# Patient Record
Sex: FEMALE | Race: WHITE | NOT HISPANIC OR LATINO | URBAN - METROPOLITAN AREA
[De-identification: names, ages, dates, MRNs, and addresses within clinical notes are randomized per-mention and may not be internally consistent; named-entity substitution may affect disease eponyms.]

---

## 2017-10-30 ENCOUNTER — HISTORICAL (OUTPATIENT)
Dept: SURGERY | Facility: HOSPITAL | Age: 62
End: 2017-10-30

## 2017-10-30 LAB
ABS NEUT (OLG): 4.5 X10(3)/MCL (ref 1.5–6.9)
APPEARANCE, UA: CLEAR
APTT PPP: 24.2 SECOND(S) (ref 25–35)
BILIRUB UR QL STRIP: NEGATIVE
BUN SERPL-MCNC: 30 MG/DL (ref 10–20)
CALCIUM SERPL-MCNC: 9.6 MG/DL (ref 8–10.5)
CHLORIDE SERPL-SCNC: 106 MMOL/L (ref 100–108)
CO2 SERPL-SCNC: 31 MMOL/L (ref 21–35)
COLOR UR: NORMAL
CREAT SERPL-MCNC: 0.9 MG/DL (ref 0.7–1.3)
ERYTHROCYTE [DISTWIDTH] IN BLOOD BY AUTOMATED COUNT: 13.6 % (ref 11.5–17)
GLUCOSE (UA): NEGATIVE
GLUCOSE SERPL-MCNC: 95 MG/DL (ref 75–116)
HCT VFR BLD AUTO: 40 % (ref 36–48)
HGB BLD-MCNC: 12.6 GM/DL (ref 12–16)
HGB UR QL STRIP: NEGATIVE
INR PPP: 0.9 (ref 0–1.2)
KETONES UR QL STRIP: NEGATIVE
LEUKOCYTE ESTERASE UR QL STRIP: NEGATIVE
MCH RBC QN AUTO: 30 PG (ref 27–34)
MCHC RBC AUTO-ENTMCNC: 32 GM/DL (ref 31–36)
MCV RBC AUTO: 95 FL (ref 80–99)
NITRITE UR QL STRIP: NEGATIVE
PH UR STRIP: 5.5 [PH]
PLATELET # BLD AUTO: 318 X10(3)/MCL (ref 140–400)
PMV BLD AUTO: 10.3 FL (ref 6.8–10)
POTASSIUM SERPL-SCNC: 4.9 MMOL/L (ref 3.6–5.2)
PROT UR QL STRIP: NEGATIVE
PROTHROMBIN TIME: 10 SECOND(S) (ref 9–12)
RBC # BLD AUTO: 4.2 X10(6)/MCL (ref 4.2–5.4)
SODIUM SERPL-SCNC: 141 MMOL/L (ref 135–145)
SP GR UR STRIP: 1.02
UROBILINOGEN UR STRIP-ACNC: 0.2 EU/DL
WBC # SPEC AUTO: 7.2 X10(3)/MCL (ref 4.5–11.5)

## 2017-11-07 ENCOUNTER — HISTORICAL (OUTPATIENT)
Dept: ANESTHESIOLOGY | Facility: HOSPITAL | Age: 62
End: 2017-11-07

## 2018-03-05 ENCOUNTER — HISTORICAL (OUTPATIENT)
Dept: RADIOLOGY | Facility: HOSPITAL | Age: 63
End: 2018-03-05

## 2018-11-07 ENCOUNTER — HISTORICAL (OUTPATIENT)
Dept: RADIOLOGY | Facility: HOSPITAL | Age: 63
End: 2018-11-07

## 2019-03-12 ENCOUNTER — HISTORICAL (OUTPATIENT)
Dept: RADIOLOGY | Facility: HOSPITAL | Age: 64
End: 2019-03-12

## 2019-03-21 ENCOUNTER — HISTORICAL (OUTPATIENT)
Dept: RADIOLOGY | Facility: HOSPITAL | Age: 64
End: 2019-03-21

## 2019-08-06 ENCOUNTER — HISTORICAL (OUTPATIENT)
Dept: ANESTHESIOLOGY | Facility: HOSPITAL | Age: 64
End: 2019-08-06

## 2020-10-05 ENCOUNTER — HISTORICAL (OUTPATIENT)
Dept: RADIOLOGY | Facility: HOSPITAL | Age: 65
End: 2020-10-05

## 2021-01-04 ENCOUNTER — HISTORICAL (OUTPATIENT)
Dept: RADIOLOGY | Facility: HOSPITAL | Age: 66
End: 2021-01-04

## 2022-01-05 ENCOUNTER — HISTORICAL (OUTPATIENT)
Dept: RADIOLOGY | Facility: HOSPITAL | Age: 67
End: 2022-01-05

## 2022-04-10 ENCOUNTER — HISTORICAL (OUTPATIENT)
Dept: ADMINISTRATIVE | Facility: HOSPITAL | Age: 67
End: 2022-04-10

## 2022-04-25 VITALS
HEIGHT: 61 IN | WEIGHT: 164.88 LBS | DIASTOLIC BLOOD PRESSURE: 79 MMHG | OXYGEN SATURATION: 100 % | SYSTOLIC BLOOD PRESSURE: 142 MMHG | BODY MASS INDEX: 31.13 KG/M2

## 2022-04-30 NOTE — OP NOTE
Patient:   Jelly Subramanian             MRN: [id number]            FIN: [id number]               Age:   63 years     Sex:  Female     :  1955   Associated Diagnoses:   Personal history of colonic polyps; Bright red rectal bleeding; Rectal polyp; Grade I internal hemorrhoids   Author:   Macy Harris MD      Operative Note   Operative Information   Date/ Time:  2019 09:35:00.     Procedures Performed: Procedure Code   Colonoscopy, flexible; with removal of tumor(s), polyp(s), or other lesion(s) by hot biopsy forceps (69319).  Colonoscopy, flexible; with ablation of tumor(s), polyp(s), or other lesion(s) (includes pre- and post-dilation and guide wire passage, when performed) (67581)..     Indications: 63-year-old white female with previous history of colon polyps on previous colonoscopy having significant pelvic pain discomfort with associated bleeding all movements in need of diagnostic colonoscopy.     Preoperative Diagnosis: Personal history of colonic polyps (QEO34-OB Z86.010), Bright red rectal bleeding (CQE12-EL K62.5).     Postoperative Diagnosis: Rectal polyp (QEG37-OO K62.1), Grade I internal hemorrhoids (PIN45-NZ K64.0).     Surgeon: Macy Harris MD.     Anesthesia: Intravenous MAC.     Speciman Removed: Rectal polyp at 10 cm from anal verge.     Description of Procedure/Findings/    Complications: Patient was brought to the endoscopy suite laid in a left lateral decubitus position right side up.  Intravenous anesthesia was provided.  Digital rectal exam performed exhibiting good anal rectal tone and no masses.  An endoscope was then passed through the anus intubating the rectum and with gentle insufflation reaching the cecum.  Upon reaching the cecum pictures were taken the scope was then slowly withdrawn.  Overall the cecum ascending transverse descending and sigmoid colon appear to be grossly normal without mass polyp or ulceration seen.  At approximately 10 cm from the anal  verge within the rectum a small subcentimeter sessile polyp was found.  Hot forcep polypectomy was performed successfully and sent to pathology.  Scope was retroflexed the distal rectum revealing small grade 1 internal hemorrhoids without sign of thrombosis ulceration or bleeding.  There was a sclerosed hemorrhoid associated with this region but otherwise no significant changes.  Scope was returned to neutral position the colon was evacuated of air.  Patient was relieved of anesthesia stable condition transfer the postanesthesia care unit..     Esimated blood loss: loss  1  cc.     Findings: 1.  Small internal hemorrhoids and small rectal polyp at 10 cm from anal verge.     Complications: None.     Notes: Mentation to repeat colonoscopy in 3 to 5 years or sooner if GI dysfunction occurs prior to the time.

## 2022-04-30 NOTE — OP NOTE
PROCEDURE PERFORMED:  Screening colonoscopy.    INDICATION FOR PROCEDURE:  This is a 61-year-old female with a history of numerous adenomatous colon polyps.  She was scoped last year and was found to have numerous polyps in various parts of her colon, so she was set up for repeat screening colonoscopy in 1 year.  She admits to lifelong constipation as well.    PROCEDURE IN DETAIL:  After informed consent was obtained, risks and benefits were explained.  She agreed to have the procedure done.  She signed the consents, was wheeled into the endoscopy suite at [facility].  She was laid in the left lateral decubitus position.  A rectal exam was done which revealed grade 1 internal and external hemorrhoids.  Once IV sedation was given, the Olympus colonoscope was lubricated and advanced with ease from the anus to the cecum.  Once in the cecum, I advanced the scope through the ileocecal valve into the terminal ileum, and this all appeared to be normal.  I placed the scope back in the cecum and insufflated air taking circumferential views and taking pictures of the ileocecal valve and appendiceal orifice.  I slowly withdrew the scope, revealing a normal cecum, normal ascending colon.  Just proximal to the hepatic flexure in the ascending colon, there was a polyp noted.  It was approximately 0 to 1/2 cm in size.  The hot snare was deployed and a hot snare polypectomy was performed.  I withdrew the scope slowly revealing a normal hepatic flexure, normal transverse colon, normal splenic flexure, normal descending and sigmoid colon.  I retroflexed revealing grade 1 internal hemorrhoids.  I placed the scope in a neutral position and withdrew.  The patient tolerated the procedure well.    POSTOPERATIVE DIAGNOSIS:    1. Grade 1 internal and external hemorrhoids.  2. Colon polyp in the ascending colon status post hot snare polypectomy.      RECOMMENDATIONS:  Follow up in my clinic in 1-2 weeks.  At  that time I will make further recommendations and will treat the hemorrhoids if they become symptomatic.        SYLVAIN/AQUILINO   DD: 12/11/2017 1021   DT: 12/11/2017 1142  Job # [id number]

## 2023-01-09 DIAGNOSIS — Z78.0 POSTMENOPAUSAL STATUS (AGE-RELATED) (NATURAL): ICD-10-CM

## 2023-01-09 DIAGNOSIS — Z12.31 ENCOUNTER FOR SCREENING MAMMOGRAM FOR MALIGNANT NEOPLASM OF BREAST: Primary | ICD-10-CM

## 2023-01-20 ENCOUNTER — HOSPITAL ENCOUNTER (OUTPATIENT)
Dept: RADIOLOGY | Facility: HOSPITAL | Age: 68
Discharge: HOME | End: 2023-01-20
Attending: FAMILY MEDICINE
Payer: MEDICARE

## 2023-01-20 DIAGNOSIS — Z12.31 ENCOUNTER FOR SCREENING MAMMOGRAM FOR MALIGNANT NEOPLASM OF BREAST: ICD-10-CM

## 2023-01-20 DIAGNOSIS — Z78.0 POSTMENOPAUSAL STATUS (AGE-RELATED) (NATURAL): ICD-10-CM

## 2023-01-20 PROCEDURE — 77063 BREAST TOMOSYNTHESIS BI: CPT | Mod: 26,,, | Performed by: STUDENT IN AN ORGANIZED HEALTH CARE EDUCATION/TRAINING PROGRAM

## 2023-01-20 PROCEDURE — 77067 MAMMO DIGITAL SCREENING BILAT WITH TOMO: ICD-10-PCS | Mod: 26,,, | Performed by: STUDENT IN AN ORGANIZED HEALTH CARE EDUCATION/TRAINING PROGRAM

## 2023-01-20 PROCEDURE — 77067 SCR MAMMO BI INCL CAD: CPT | Mod: TC

## 2023-01-20 PROCEDURE — 77063 MAMMO DIGITAL SCREENING BILAT WITH TOMO: ICD-10-PCS | Mod: 26,,, | Performed by: STUDENT IN AN ORGANIZED HEALTH CARE EDUCATION/TRAINING PROGRAM

## 2023-01-20 PROCEDURE — 77067 SCR MAMMO BI INCL CAD: CPT | Mod: 26,,, | Performed by: STUDENT IN AN ORGANIZED HEALTH CARE EDUCATION/TRAINING PROGRAM

## 2023-01-20 PROCEDURE — 77080 DXA BONE DENSITY AXIAL: CPT | Mod: TC

## 2024-02-16 ENCOUNTER — HOSPITAL ENCOUNTER (OUTPATIENT)
Dept: RADIOLOGY | Facility: HOSPITAL | Age: 69
Discharge: HOME | End: 2024-02-16
Attending: FAMILY MEDICINE
Payer: MEDICARE

## 2024-02-16 DIAGNOSIS — Z12.31 ENCOUNTER FOR SCREENING MAMMOGRAM FOR BREAST CANCER: ICD-10-CM

## 2024-02-16 PROCEDURE — 77063 BREAST TOMOSYNTHESIS BI: CPT | Mod: 26,,, | Performed by: STUDENT IN AN ORGANIZED HEALTH CARE EDUCATION/TRAINING PROGRAM

## 2024-02-16 PROCEDURE — 77067 SCR MAMMO BI INCL CAD: CPT | Mod: 26,,, | Performed by: STUDENT IN AN ORGANIZED HEALTH CARE EDUCATION/TRAINING PROGRAM

## 2024-02-16 PROCEDURE — 77067 SCR MAMMO BI INCL CAD: CPT | Mod: TC

## 2024-11-18 DIAGNOSIS — Z12.31 ENCOUNTER FOR SCREENING MAMMOGRAM FOR BREAST CANCER: Primary | ICD-10-CM

## 2025-02-18 ENCOUNTER — HOSPITAL ENCOUNTER (OUTPATIENT)
Dept: RADIOLOGY | Facility: HOSPITAL | Age: 70
Discharge: HOME | End: 2025-02-18
Attending: FAMILY MEDICINE
Payer: MEDICARE

## 2025-02-18 DIAGNOSIS — Z12.31 ENCOUNTER FOR SCREENING MAMMOGRAM FOR BREAST CANCER: ICD-10-CM

## 2025-02-18 PROCEDURE — 77067 SCR MAMMO BI INCL CAD: CPT | Mod: TC

## 2025-04-28 RX ORDER — ROSUVASTATIN CALCIUM 20 MG/1
20 TABLET, COATED ORAL EVERY MORNING
COMMUNITY

## 2025-04-28 RX ORDER — OMEPRAZOLE 40 MG/1
40 CAPSULE, DELAYED RELEASE ORAL EVERY MORNING
COMMUNITY

## 2025-04-28 RX ORDER — BUPROPION HYDROCHLORIDE 150 MG/1
150 TABLET ORAL EVERY MORNING
COMMUNITY

## 2025-04-28 RX ORDER — ALBUTEROL SULFATE 90 UG/1
2 INHALANT RESPIRATORY (INHALATION)
COMMUNITY
Start: 2025-01-06

## 2025-04-28 RX ORDER — AMLODIPINE BESYLATE 5 MG/1
5 TABLET ORAL EVERY MORNING
COMMUNITY
Start: 2024-12-20

## 2025-04-28 RX ORDER — LISINOPRIL AND HYDROCHLOROTHIAZIDE 12.5; 2 MG/1; MG/1
1 TABLET ORAL EVERY MORNING
COMMUNITY

## 2025-04-28 RX ORDER — ASPIRIN 81 MG/1
81 TABLET ORAL EVERY MORNING
COMMUNITY

## 2025-05-01 ENCOUNTER — HOSPITAL ENCOUNTER (OUTPATIENT)
Dept: RADIOLOGY | Facility: HOSPITAL | Age: 70
Discharge: HOME | End: 2025-05-01
Attending: INTERNAL MEDICINE
Payer: MEDICARE

## 2025-05-01 ENCOUNTER — CLINICAL SUPPORT (OUTPATIENT)
Dept: RESPIRATORY THERAPY | Facility: HOSPITAL | Age: 70
End: 2025-05-01
Attending: INTERNAL MEDICINE
Payer: MEDICARE

## 2025-05-01 DIAGNOSIS — Z12.11 SCREEN FOR COLON CANCER: ICD-10-CM

## 2025-05-01 DIAGNOSIS — Z01.811 PRE-OP CHEST EXAM: ICD-10-CM

## 2025-05-01 DIAGNOSIS — Z12.11 SCREEN FOR COLON CANCER: Primary | ICD-10-CM

## 2025-05-01 LAB
OHS QRS DURATION: 66 MS
OHS QTC CALCULATION: 426 MS

## 2025-05-01 PROCEDURE — 93005 ELECTROCARDIOGRAM TRACING: CPT

## 2025-05-01 PROCEDURE — 93010 ELECTROCARDIOGRAM REPORT: CPT | Mod: ,,, | Performed by: INTERNAL MEDICINE

## 2025-05-01 PROCEDURE — 71046 X-RAY EXAM CHEST 2 VIEWS: CPT | Mod: TC

## 2025-05-01 RX ORDER — SERTRALINE HYDROCHLORIDE 100 MG/1
100 TABLET, FILM COATED ORAL EVERY MORNING
COMMUNITY
Start: 2025-03-18

## 2025-05-01 RX ORDER — ACETAMINOPHEN 500 MG
2000 TABLET ORAL EVERY MORNING
COMMUNITY

## 2025-05-01 NOTE — DISCHARGE INSTRUCTIONS
Follow prep on Monday. Clear liquids only. Nothing by mouth after midnight. Stop Aspirin. Take Amlodipine AM of procedure with small sip of water.           INSTRUCTIONS  AFTER A COLONOSCOPY/EGD    NO DRIVING X 24 HOURS. NOTIFY YOUR DOCTOR WITH     ABDOMINAL PAIN UNRELIEVED BY  PASSING GAS,   FEVER WITHIN 24 HOURS, OR LARGE AMOUNT OF BLEEDING.

## 2025-05-05 ENCOUNTER — ANESTHESIA EVENT (OUTPATIENT)
Dept: SURGERY | Facility: HOSPITAL | Age: 70
End: 2025-05-05
Payer: MEDICARE

## 2025-05-05 NOTE — ANESTHESIA PREPROCEDURE EVALUATION
05/05/2025  Eneida Subramanian is a 69 y.o., female.      Pre-op Assessment    I have reviewed the Patient Summary Reports.     I have reviewed the Nursing Notes. I have reviewed the NPO Status.   I have reviewed the Medications.     Review of Systems  Anesthesia Hx:             Denies Family Hx of Anesthesia complications.    Denies Personal Hx of Anesthesia complications.                    Social:  Former Smoker, Alcohol Use       Hematology/Oncology:  Hematology Normal   Oncology Normal                                   EENT/Dental:  EENT/Dental Normal           Cardiovascular:     Hypertension, well controlled              ECG has been reviewed.                            Pulmonary:  Pulmonary Normal                       Renal/:  Renal/ Normal                 Hepatic/GI:     GERD, well controlled                Musculoskeletal:  Musculoskeletal Normal                Neurological:  Neurology Normal                                      Endocrine:  Endocrine Normal          Obesity / BMI > 30  Dermatological:  Skin Normal    Psych:  Psychiatric History                  Physical Exam  General: Cooperative, Alert and Oriented    Airway:  Mallampati: II   Mouth Opening: Normal  TM Distance: Normal  Tongue: Normal  Neck ROM: Normal ROM    Dental:  Intact        Anesthesia Plan  Type of Anesthesia, risks & benefits discussed:    Anesthesia Type: Gen Natural Airway  Intra-op Monitoring Plan: Standard ASA Monitors  Post Op Pain Control Plan:   (medical reason for not using multimodal pain management)  Induction:  IV  Informed Consent: Informed consent signed with the Patient and all parties understand the risks and agree with anesthesia plan.  All questions answered. Patient consented to blood products? Yes  ASA Score: 3    Ready For Surgery From Anesthesia Perspective.     .

## 2025-05-06 ENCOUNTER — HOSPITAL ENCOUNTER (OUTPATIENT)
Facility: HOSPITAL | Age: 70
Discharge: HOME | End: 2025-05-06
Attending: INTERNAL MEDICINE | Admitting: INTERNAL MEDICINE
Payer: MEDICARE

## 2025-05-06 ENCOUNTER — ANESTHESIA (OUTPATIENT)
Dept: SURGERY | Facility: HOSPITAL | Age: 70
End: 2025-05-06
Payer: MEDICARE

## 2025-05-06 VITALS
DIASTOLIC BLOOD PRESSURE: 77 MMHG | RESPIRATION RATE: 18 BRPM | WEIGHT: 187 LBS | HEART RATE: 67 BPM | HEIGHT: 64 IN | BODY MASS INDEX: 31.92 KG/M2 | OXYGEN SATURATION: 98 % | TEMPERATURE: 98 F | SYSTOLIC BLOOD PRESSURE: 130 MMHG

## 2025-05-06 DIAGNOSIS — Z12.11 SCREEN FOR COLON CANCER: ICD-10-CM

## 2025-05-06 DIAGNOSIS — Z12.11 SCREENING FOR COLON CANCER: ICD-10-CM

## 2025-05-06 PROCEDURE — 45385 COLONOSCOPY W/LESION REMOVAL: CPT | Mod: PT | Performed by: INTERNAL MEDICINE

## 2025-05-06 PROCEDURE — 37000009 HC ANESTHESIA EA ADD 15 MINS: Performed by: INTERNAL MEDICINE

## 2025-05-06 PROCEDURE — C1773 RET DEV, INSERTABLE: HCPCS | Performed by: INTERNAL MEDICINE

## 2025-05-06 PROCEDURE — D9220A PRA ANESTHESIA: Mod: PT,,, | Performed by: NURSE ANESTHETIST, CERTIFIED REGISTERED

## 2025-05-06 PROCEDURE — 63600175 PHARM REV CODE 636 W HCPCS: Performed by: NURSE ANESTHETIST, CERTIFIED REGISTERED

## 2025-05-06 PROCEDURE — 37000008 HC ANESTHESIA 1ST 15 MINUTES: Performed by: INTERNAL MEDICINE

## 2025-05-06 PROCEDURE — 88305 TISSUE EXAM BY PATHOLOGIST: CPT | Performed by: INTERNAL MEDICINE

## 2025-05-06 RX ORDER — PHENYLEPHRINE HYDROCHLORIDE 10 MG/ML
INJECTION INTRAVENOUS
Status: DISCONTINUED | OUTPATIENT
Start: 2025-05-06 | End: 2025-05-06

## 2025-05-06 RX ORDER — METOCLOPRAMIDE HYDROCHLORIDE 5 MG/ML
INJECTION INTRAMUSCULAR; INTRAVENOUS
Status: DISCONTINUED | OUTPATIENT
Start: 2025-05-06 | End: 2025-05-06

## 2025-05-06 RX ORDER — PROPOFOL 10 MG/ML
VIAL (ML) INTRAVENOUS
Status: DISCONTINUED | OUTPATIENT
Start: 2025-05-06 | End: 2025-05-06

## 2025-05-06 RX ORDER — LIDOCAINE HYDROCHLORIDE 20 MG/ML
INJECTION INTRAVENOUS
Status: DISCONTINUED | OUTPATIENT
Start: 2025-05-06 | End: 2025-05-06

## 2025-05-06 RX ADMIN — PROPOFOL 75 MG: 10 INJECTION, EMULSION INTRAVENOUS at 06:05

## 2025-05-06 RX ADMIN — PROPOFOL 25 MG: 10 INJECTION, EMULSION INTRAVENOUS at 06:05

## 2025-05-06 RX ADMIN — PHENYLEPHRINE HYDROCHLORIDE 100 MCG: 10 INJECTION INTRAVENOUS at 06:05

## 2025-05-06 RX ADMIN — PROPOFOL 25 MG: 10 INJECTION, EMULSION INTRAVENOUS at 07:05

## 2025-05-06 RX ADMIN — LIDOCAINE HYDROCHLORIDE 100 MG: 20 INJECTION, SOLUTION INTRAVENOUS at 06:05

## 2025-05-06 RX ADMIN — METOCLOPRAMIDE 10 MG: 5 INJECTION, SOLUTION INTRAMUSCULAR; INTRAVENOUS at 06:05

## 2025-05-06 RX ADMIN — PHENYLEPHRINE HYDROCHLORIDE 100 MCG: 10 INJECTION INTRAVENOUS at 07:05

## 2025-05-06 NOTE — INTERVAL H&P NOTE
The patient has been examined and the H&P has been reviewed:    I concur with the findings and no changes have occurred since H&P was written.    Anesthesia/Surgery risks, benefits and alternative options discussed and understood by patient/family.          There are no hospital problems to display for this patient.     Triage and Transition Services- Patient Follow Up Call  Service Line Making Phone Call: Telemedicine    Who did Writer Talk to: Patient    Details of Call: Pt declines any scheduling assistance at this moment. Given DEC queue phone number.    Nichole Romero 11/11/2024 4:22 PM

## 2025-05-06 NOTE — PROCEDURES
"Eneida Subramanian is a 69 y.o. female patient.    Temp: 97.9 °F (36.6 °C) (05/06/25 0548)  Pulse: 90 (05/06/25 0548)  Resp: 18 (05/06/25 0548)  BP: 122/78 (05/06/25 0548)  SpO2: 97 % (05/06/25 0548)  Weight: 84.8 kg (187 lb) (05/06/25 0548)  Height: 5' 4" (162.6 cm) (05/06/25 0548)       Procedures    5/6/2025      Screening colonoscopy    Indication for procedure   Age-appropriate health maintenance, history of colon polyps        Procedure in detail   After informed consent was obtained the risks and benefits were explained and she agreed to have the procedures done.  Consents were signed and she was willing to operating room 2. At [facility].  The operating room was utilized secondary to the endoscopy area under current construction.  He she was monitored and sedated as per Anesthesia see Mar for full details.  Once adequate sedation was done a rectal exam was done revealing no obstruction and then the Olympus colonoscope was lubricated and advanced with some difficulty in the area of the cecum.  There was a redundant bowel loop with a tortuosity at the hepatic flexure with some external pressure on the abdomen and careful manipulation I was eventually able to advance all the way to the cecum.  The cecum was unprepped and I washed and aspirated the best that I could but not all the colonic wall could be visualized in the cecum and as I opened up the ileocecal valve it dropped a tremendous amount of stool so I do not advanced into the ileocecal area.  Picture was taken showed I was at the cecum and the appendiceal orifice.  I withdrew the scope slowly taken circumferential views revealing a normal ascending colon.  Again noting the tortuosity of the hepatic flexure just distal to the hepatic flexure in the transverse colon proximally 80 cm of scope there was a 4 mm polyp this was successfully taken down with the hot snare and retrieved in the polyp trap.  Hemostasis was observed and picture was " taken showing this has well.  I slowly withdrew the scope rest of the transverse colon was normal splenic flexure was normal descending sigmoid colon were normal retroflexed revealing a grade 1 internal hemorrhoids with a remnant then placed coping neutral position and withdrew.  Patient tolerated procedure well.  There were no complications other than the unprepped cecum and tortuosity of the hepatic flexure.  Less than 1 cc of blood loss total.      Postop diagnosis  1. 4 mm polyp at 80 cm of scope in the transverse colon which is just distal to the hepatic flexure status post hot snare polypectomy    2. Grade 1 internal hemorrhoid with hemorrhoid remnant     3. Tortuosity of the hepatic flexure with a redundant bowel loop          Recommendations  Follow up in my clinic in 1-2 weeks for pathology review at that time I make further recommendations      Cc:  Dr. AUTUMN Laguna

## 2025-05-06 NOTE — DISCHARGE SUMMARY
[facility]  Discharge Note  Short Stay    Procedure(s) (LRB):  COLONOSCOPY (N/A)  COLONOSCOPY, WITH POLYPECTOMY USING SNARE      OUTCOME: Patient tolerated treatment/procedure well without complication and is now ready for discharge.    DISPOSITION: Home or Self Care    FINAL DIAGNOSIS:  <principal problem not specified>    FOLLOWUP: In clinic    DISCHARGE INSTRUCTIONS:  No discharge procedures on file.      Clinical Reference Documents Added to Patient Instructions         Document    COLONOSCOPY DISCHARGE INSTRUCTIONS (ENGLISH)            TIME SPENT ON DISCHARGE: 11 minutes

## 2025-05-06 NOTE — ANESTHESIA POSTPROCEDURE EVALUATION
Anesthesia Post Evaluation    Patient: Eneida Subramanian    Procedure(s) Performed: Procedure(s) (LRB):  COLONOSCOPY (N/A)  COLONOSCOPY, WITH POLYPECTOMY USING SNARE    Final Anesthesia Type: general      Patient location during evaluation: OPS  Patient participation: Yes- Able to Participate  Level of consciousness: awake and alert  Post-procedure vital signs: reviewed and stable  Pain management: adequate  Airway patency: patent  NATHALIE mitigation strategies: Multimodal analgesia  PONV status at discharge: No PONV  Anesthetic complications: no      Cardiovascular status: hemodynamically stable  Respiratory status: unassisted, room air and spontaneous ventilation  Hydration status: euvolemic  Follow-up not needed.              Vitals Value Taken Time   /78 05/06/25 05:48   Temp 36.6 °C (97.9 °F) 05/06/25 05:48   Pulse 90 05/06/25 05:48   Resp 18 05/06/25 05:48   SpO2 97 % 05/06/25 05:48         No case tracking events are documented in the log.      Pain/Cee Score: No data recorded

## 2025-05-07 LAB — PSYCHE PATHOLOGY RESULT: NORMAL

## 2025-05-19 DIAGNOSIS — Z78.0 POSTMENOPAUSAL: Primary | ICD-10-CM

## 2025-05-29 ENCOUNTER — HOSPITAL ENCOUNTER (OUTPATIENT)
Dept: RADIOLOGY | Facility: HOSPITAL | Age: 70
Discharge: HOME | End: 2025-05-29
Attending: FAMILY MEDICINE
Payer: MEDICARE

## 2025-05-29 DIAGNOSIS — Z78.0 POSTMENOPAUSAL: ICD-10-CM

## 2025-05-29 PROCEDURE — 77080 DXA BONE DENSITY AXIAL: CPT | Mod: TC

## (undated) DEVICE — PAD ELECTROSURGICAL SPL W/CORD

## (undated) DEVICE — KIT SURGICAL COLON .25 1.1OZ

## (undated) DEVICE — SNARE POLYP STD SNG 7FR